# Patient Record
Sex: MALE | Race: BLACK OR AFRICAN AMERICAN
[De-identification: names, ages, dates, MRNs, and addresses within clinical notes are randomized per-mention and may not be internally consistent; named-entity substitution may affect disease eponyms.]

---

## 2019-10-24 ENCOUNTER — HOSPITAL ENCOUNTER (EMERGENCY)
Dept: HOSPITAL 61 - ER | Age: 24
Discharge: HOME | End: 2019-10-24
Payer: SELF-PAY

## 2019-10-24 VITALS — SYSTOLIC BLOOD PRESSURE: 115 MMHG | DIASTOLIC BLOOD PRESSURE: 87 MMHG

## 2019-10-24 VITALS — BODY MASS INDEX: 18.04 KG/M2 | WEIGHT: 119 LBS | HEIGHT: 68 IN

## 2019-10-24 DIAGNOSIS — K04.7: Primary | ICD-10-CM

## 2019-10-24 DIAGNOSIS — K08.89: ICD-10-CM

## 2019-10-24 PROCEDURE — 99283 EMERGENCY DEPT VISIT LOW MDM: CPT

## 2019-10-24 NOTE — PHYS DOC
Past Medical History


Past Medical History:  No Pertinent History


Past Surgical History:  No Surgical History


Additional Information:  


BLACK & MILDS, 1 A DAY


Alcohol Use:  None





Adult General


Chief Complaint


Chief Complaint:  DENTAL PROBLEM





HPI


HPI





Patient is a 24  year old AA male who presents to the emergency Department today

with complaints of right upper quadrant dental pain for the last 2 days. Patient

states he has a few broken teeth in the same area. He denies any pus drainage, 

fever, nausea, vomiting, sore throat, ear pain, shortness breath, cough, or 

headache. He currently rates his pain a 10 out of 10 on the pain scale, he 

states he has not taken anything for the pain. All other ROS is neg unless 

otherwise noted in HPI.





Review of Systems


Review of Systems


See Above





Allergies


Allergies





Allergies








Coded Allergies Type Severity Reaction Last Updated Verified


 


  No Known Drug Allergies    10/24/19 No











Physical Exam


Physical Exam


See Above


Constitutional: Well developed, well nourished, no acute distress, non-toxic 

appearance. []


HENT: Normocephalic, atraumatic, bilateral external ears normal, oropharynx 

moist, no oral exudates, nose normal; several broken missing teeth in RUQ, no 

visible or palpable abscess []


Eyes: PERRLA, EOMI, conjunctiva normal, no discharge. [] 


Neck: Normal range of motion, no tenderness, supple, no stridor. [] 


Cardiovascular:Heart rate regular rhythm


Lungs & Thorax:  Respirations even and unlabored, no retractions, no respiratory

 distress


Skin: Warm, dry, no erythema, no rash. [] 


Extremities: No cyanosis, ROM intact, no edema. [] 


Neurologic: Alert and oriented X 3, no focal deficits noted. []


Psychologic: Affect normal, judgement normal, mood normal. []





Current Patient Data


Vital Signs





                                   Vital Signs








  Date Time  Temp Pulse Resp B/P (MAP) Pulse Ox O2 Delivery O2 Flow Rate FiO2


 


10/24/19 11:04 98.4 80 17 115/87 (96) 99 Room Air  





 98.4       











EKG


EKG


[]





Radiology/Procedures


Radiology/Procedures


[]





Course & Med Decision Making


Course & Med Decision Making


Pertinent Labs and Imaging studies reviewed. (See chart for details)





[]





Dragon Disclaimer


Dragon Disclaimer


This electronic medical record was generated, in whole or in part, using a voice

 recognition dictation system.





Departure


Departure


Impression:  


   Primary Impression:  


   Dentalgia


   Additional Impression:  


   Infected dental carries


Disposition:  01 HOME, SELF-CARE


Condition:  STABLE


Referrals:  


NO PCP (PCP)


Patient Instructions:  Dental Caries, Dental Pain, Easy-to-Read





Additional Instructions:  


Fill prescription(s) and use as directed. Follow up with dentist using the 

referral list provided. Return to the ER if symptoms worsen.


Scripts


Naproxen (NAPROXEN) 500 Mg Tablet


1 TAB PO BID PRN for PAIN for 10 Days, #20 TAB 0 Refills


   Prov: MALCOLM JUAREZ         10/24/19 


Penicillin V Potassium (PENICILLIN V POTASSIUM) 500 Mg Tablet


1 TAB PO QID for 10 Days, #40 TAB 0 Refills


   Prov: MALCOLM JUAREZ         10/24/19





Problem Qualifiers











MALCOLM JUAREZ       Oct 24, 2019 11:46